# Patient Record
Sex: FEMALE | Race: OTHER | HISPANIC OR LATINO | URBAN - METROPOLITAN AREA
[De-identification: names, ages, dates, MRNs, and addresses within clinical notes are randomized per-mention and may not be internally consistent; named-entity substitution may affect disease eponyms.]

---

## 2022-08-19 ENCOUNTER — EMERGENCY (EMERGENCY)
Facility: HOSPITAL | Age: 57
LOS: 1 days | Discharge: HOME | End: 2022-08-19
Attending: EMERGENCY MEDICINE | Admitting: EMERGENCY MEDICINE
Payer: COMMERCIAL

## 2022-08-19 VITALS
SYSTOLIC BLOOD PRESSURE: 94 MMHG | HEART RATE: 78 BPM | OXYGEN SATURATION: 99 % | WEIGHT: 139.99 LBS | TEMPERATURE: 98 F | DIASTOLIC BLOOD PRESSURE: 62 MMHG | HEIGHT: 64 IN | RESPIRATION RATE: 18 BRPM

## 2022-08-19 VITALS
OXYGEN SATURATION: 99 % | TEMPERATURE: 98 F | HEART RATE: 83 BPM | SYSTOLIC BLOOD PRESSURE: 104 MMHG | RESPIRATION RATE: 16 BRPM | DIASTOLIC BLOOD PRESSURE: 67 MMHG

## 2022-08-19 LAB
ALBUMIN SERPL ELPH-MCNC: 4 G/DL — SIGNIFICANT CHANGE UP (ref 3.3–5)
ALP SERPL-CCNC: 87 U/L — SIGNIFICANT CHANGE UP (ref 40–120)
ALT FLD-CCNC: 36 U/L — SIGNIFICANT CHANGE UP (ref 10–45)
ANION GAP SERPL CALC-SCNC: 8 MMOL/L — SIGNIFICANT CHANGE UP (ref 5–17)
AST SERPL-CCNC: 33 U/L — SIGNIFICANT CHANGE UP (ref 10–40)
BASOPHILS # BLD AUTO: 0.05 K/UL — SIGNIFICANT CHANGE UP (ref 0–0.2)
BASOPHILS NFR BLD AUTO: 0.5 % — SIGNIFICANT CHANGE UP (ref 0–2)
BILIRUB SERPL-MCNC: 0.2 MG/DL — SIGNIFICANT CHANGE UP (ref 0.2–1.2)
BUN SERPL-MCNC: 19 MG/DL — SIGNIFICANT CHANGE UP (ref 7–23)
CALCIUM SERPL-MCNC: 9 MG/DL — SIGNIFICANT CHANGE UP (ref 8.4–10.5)
CHLORIDE SERPL-SCNC: 100 MMOL/L — SIGNIFICANT CHANGE UP (ref 96–108)
CO2 SERPL-SCNC: 29 MMOL/L — SIGNIFICANT CHANGE UP (ref 22–31)
CREAT SERPL-MCNC: 1.03 MG/DL — SIGNIFICANT CHANGE UP (ref 0.5–1.3)
D DIMER BLD IA.RAPID-MCNC: 222 NG/ML DDU — SIGNIFICANT CHANGE UP
EGFR: 64 ML/MIN/1.73M2 — SIGNIFICANT CHANGE UP
EOSINOPHIL # BLD AUTO: 0.08 K/UL — SIGNIFICANT CHANGE UP (ref 0–0.5)
EOSINOPHIL NFR BLD AUTO: 0.8 % — SIGNIFICANT CHANGE UP (ref 0–6)
GLUCOSE SERPL-MCNC: 119 MG/DL — HIGH (ref 70–99)
HCT VFR BLD CALC: 36.2 % — SIGNIFICANT CHANGE UP (ref 34.5–45)
HGB BLD-MCNC: 12 G/DL — SIGNIFICANT CHANGE UP (ref 11.5–15.5)
IMM GRANULOCYTES NFR BLD AUTO: 0.2 % — SIGNIFICANT CHANGE UP (ref 0–1.5)
LYMPHOCYTES # BLD AUTO: 1.96 K/UL — SIGNIFICANT CHANGE UP (ref 1–3.3)
LYMPHOCYTES # BLD AUTO: 19.7 % — SIGNIFICANT CHANGE UP (ref 13–44)
MAGNESIUM SERPL-MCNC: 2.3 MG/DL — SIGNIFICANT CHANGE UP (ref 1.6–2.6)
MCHC RBC-ENTMCNC: 29.2 PG — SIGNIFICANT CHANGE UP (ref 27–34)
MCHC RBC-ENTMCNC: 33.1 GM/DL — SIGNIFICANT CHANGE UP (ref 32–36)
MCV RBC AUTO: 88.1 FL — SIGNIFICANT CHANGE UP (ref 80–100)
MONOCYTES # BLD AUTO: 0.79 K/UL — SIGNIFICANT CHANGE UP (ref 0–0.9)
MONOCYTES NFR BLD AUTO: 7.9 % — SIGNIFICANT CHANGE UP (ref 2–14)
NEUTROPHILS # BLD AUTO: 7.05 K/UL — SIGNIFICANT CHANGE UP (ref 1.8–7.4)
NEUTROPHILS NFR BLD AUTO: 70.9 % — SIGNIFICANT CHANGE UP (ref 43–77)
NRBC # BLD: 0 /100 WBCS — SIGNIFICANT CHANGE UP (ref 0–0)
PLATELET # BLD AUTO: 322 K/UL — SIGNIFICANT CHANGE UP (ref 150–400)
POTASSIUM SERPL-MCNC: 4.2 MMOL/L — SIGNIFICANT CHANGE UP (ref 3.5–5.3)
POTASSIUM SERPL-SCNC: 4.2 MMOL/L — SIGNIFICANT CHANGE UP (ref 3.5–5.3)
PROT SERPL-MCNC: 6.8 G/DL — SIGNIFICANT CHANGE UP (ref 6–8.3)
RBC # BLD: 4.11 M/UL — SIGNIFICANT CHANGE UP (ref 3.8–5.2)
RBC # FLD: 14.5 % — SIGNIFICANT CHANGE UP (ref 10.3–14.5)
SARS-COV-2 RNA SPEC QL NAA+PROBE: NEGATIVE — SIGNIFICANT CHANGE UP
SODIUM SERPL-SCNC: 137 MMOL/L — SIGNIFICANT CHANGE UP (ref 135–145)
TROPONIN T SERPL-MCNC: 0.01 NG/ML — SIGNIFICANT CHANGE UP (ref 0–0.01)
WBC # BLD: 9.95 K/UL — SIGNIFICANT CHANGE UP (ref 3.8–10.5)
WBC # FLD AUTO: 9.95 K/UL — SIGNIFICANT CHANGE UP (ref 3.8–10.5)

## 2022-08-19 PROCEDURE — 71046 X-RAY EXAM CHEST 2 VIEWS: CPT | Mod: 26

## 2022-08-19 PROCEDURE — 71046 X-RAY EXAM CHEST 2 VIEWS: CPT

## 2022-08-19 PROCEDURE — 99285 EMERGENCY DEPT VISIT HI MDM: CPT

## 2022-08-19 PROCEDURE — 70450 CT HEAD/BRAIN W/O DYE: CPT | Mod: MA

## 2022-08-19 PROCEDURE — 70450 CT HEAD/BRAIN W/O DYE: CPT | Mod: 26,MA

## 2022-08-19 PROCEDURE — 99285 EMERGENCY DEPT VISIT HI MDM: CPT | Mod: 25

## 2022-08-19 PROCEDURE — 36415 COLL VENOUS BLD VENIPUNCTURE: CPT

## 2022-08-19 PROCEDURE — 85379 FIBRIN DEGRADATION QUANT: CPT

## 2022-08-19 PROCEDURE — 84484 ASSAY OF TROPONIN QUANT: CPT

## 2022-08-19 PROCEDURE — 80053 COMPREHEN METABOLIC PANEL: CPT

## 2022-08-19 PROCEDURE — 87635 SARS-COV-2 COVID-19 AMP PRB: CPT

## 2022-08-19 PROCEDURE — 85025 COMPLETE CBC W/AUTO DIFF WBC: CPT

## 2022-08-19 PROCEDURE — 83735 ASSAY OF MAGNESIUM: CPT

## 2022-08-19 PROCEDURE — 93010 ELECTROCARDIOGRAM REPORT: CPT

## 2022-08-19 PROCEDURE — 82962 GLUCOSE BLOOD TEST: CPT

## 2022-08-19 PROCEDURE — 93005 ELECTROCARDIOGRAM TRACING: CPT

## 2022-08-19 RX ORDER — SODIUM CHLORIDE 9 MG/ML
1000 INJECTION INTRAMUSCULAR; INTRAVENOUS; SUBCUTANEOUS ONCE
Refills: 0 | Status: COMPLETED | OUTPATIENT
Start: 2022-08-19 | End: 2022-08-19

## 2022-08-19 RX ADMIN — SODIUM CHLORIDE 1000 MILLILITER(S): 9 INJECTION INTRAMUSCULAR; INTRAVENOUS; SUBCUTANEOUS at 16:13

## 2022-08-19 NOTE — ED ADULT NURSE NOTE - OBJECTIVE STATEMENT
55 y/o female BIBEMS for evaluation of syncope episode today, Pt reports she felt dizzy, then lost consciousness, no head injury, hx of HTN.

## 2022-08-19 NOTE — ED ADULT NURSE NOTE - CHIEF COMPLAINT QUOTE
I was unable to address this during the day.  After office hours I did send a prescription for Zofran to her listed pharmacy.    She lives [place].  Do they have IV therapy at that clinic?  If so let set her up with IV therapy where she can get a L of fluid and IV Zofran as necessary.    I did try calling her.  Her voicemail was not receiving messages.   syncope

## 2022-08-19 NOTE — ED PROVIDER NOTE - NSFOLLOWUPINSTRUCTIONS_ED_ALL_ED_FT
Please follow up with your primary physician in 1-2 days for re evaluation.  Please return to ER immediately should your symptoms worsen or if you have any concern prior to this recommended follow up.    Por favor, faça o acompanhamento com seu médico primário em 1-2 carter para reavaliação. Por favor, retorne ao pronto-destiny imediatamente se seus sintomas piorarem ou se você tiver alguma preocupação antes deste acompanhamento recomendado.      Quase síncope    O QUE VOCÊ PRECISA SABER:    Quase síncope, também chamada de pré-síncope, é a sensação de que você pode desmaiar (perder a consciência), mas não desmaia. Você pode controlar alguns problemas de saúde que causam a quase síncope. Seu médico pode ajudar a criar um plano para controlar e evitar episódios de quase síncope.    INSTRUÇÕES APÓS A YAJAIRA:    Volte à emergência se:  •Você tiver lorri repentina no peito.      •Você tiver dificuldade para respirar ou falta de ar.      •Você tiver mudanças na visão, estiver suando e tiver náuseas enquanto estiver sentado ou deitado.      •Você se sentir tonto ou enrubescido e seu coração estiver irregular.      •Você perder a consciência.      •Você não puder usar o braço, a mão, o pé ou a monse, ou nolan parece fraco.      •Você tiver dificuldade para falar ou entender os outros quando eles falam.      Entre em contato com seu médico se:  •Você tiver novos sintomas ou se os sintomas piorarem.      •Seu coração bater mais rápido ou mais devagar do que o normal.      •Você tiver dúvidas ou preocupações quanto ao seu problema de saúde ou alma lidar com nolan.      Medicamentos:  •Medicamentospoderão ser necessários para ajudar seu coração a bater regularmente e com força. Seu médico também pode fazer alterações nos medicamentos que estão causando síncope.      •[place] seu medicamento conforme orientado.Entre em contato com o seu médico se achar que o medicamento não está ajudando ou se você apresentar efeitos colaterais. Informe a nolan se você for alérgico a algum medicamento. [place] moon lista de todos os medicamentos, vitaminas e ervas (fitoterápicos) que você dejah. Inclua a quantidade, quando e por que os usa. Leve a lista ou os frascos de comprimidos às consultas de acompanhamento. Leve sua lista de medicamentos consigo em kevin de emergência.      Faça o acompanhamento com seu médico conforme orientado:Você pode precisar de mais exames para ajudar a descobrir a causa de seus episódios de quase síncope. Os exames ajudarão o médico a planejar o melhor tratamento para você. Anote as dúvidas que você tiver para se lembrar de perguntar sobre elas maureen as consultas.    Controle a quase síncope:  •Sente-se ou deite-se quando necessário.Isso inclui quando você se sentir tonto, sua garganta ficar apertada e você tiver alterações na visão. Eleve as pernas acima do nível de seu coração.      •Tente respirar devagar e fundo se você começar a respirar mais rápido por ansiedade ou medo.Isso pode ajudar a diminuir a tontura e a sensação de que você pode desmaiar.      •[place] um registro de seus episódios de quase síncope.Inclua seus sintomas e sua atividade antes e depois de um episódio. O registro pode ajudar o seu médico a localizar a causa da quase síncope e ajudar você a controlar os episódios.      Evite um episódio de quase síncope:  •Mova-se lentamente e se acostume com moon posição antes de mudar para outra.Isso é muito importante ao mudar de moon posição deitada para moon sentada ou em pé. Respire fundo algumas vezes antes de se levantar quando estiver deitado. [place]-se lentamente. movimentos repentinos podem causar um episódio de desmaio. Sente-se sobre o lado da cama ou sofá por alguns minutos antes de levantar. Se você está em repouso, tente ficar na vertical por aproximadamente duas horas por daniel ou conforme orientado. Não fique com as pernas na mesma posição se você estiver de pé por um beba período de tempo. Mova suas pernas e curve os joelhos para manter o fluxo de sangue.      •Siga as recomendações do médico.O médico pode recomendar que você eden mais líquidos para evitar a desidratação. Talvez seja necessário aumentar a ingestão de sal para evitar que a pressão arterial fique muito baixa e cause síncope. Seu médico lhe dirá quanto de líquido e de sódio ingerir por daniel. Nolan(a) também dirá quanta atividade física é garza para você. Isso vai depender da causa da quase síncope.      •Observe sinais de baixo nível de glicose no sangue.Eles incluem fome, nervosismo, suor e pulsação rápida ou irregular. [place] com seu médico sobre formas de manter o nível de glicose estável.      •Verifique sua pressão arterial com frequência.Isso é importante se você dejah medicamentos para diminuir a pressão arterial. Verifique sua pressão arterial quando você estiver deitado e ao ficar de pé. Pergunte com que frequência verificar maureen o daniel. [place] um registro de sua pressão arterial. Seu médico pode usar o registro para ajudar no planejamento do tratamento.  Alma analisar moon pressão arterial           •Não faça força se estiver constipado.Você pode desmaiar kvein se esforce para evacuar. A caminhada é a melhor maneira de facilitar a evacuação. Coma alimentos ricos em fibra para facilitar a evacuação. Bons exemplos são cereais ricos em fibras, grãos, legumes e verduras e pães integrais. Suco de ameixa pode ajudar a amolecer as fezes.      •Não se exercite ao ar livre em carter quentes.A combinação de atividade física e calor pode natalie à desidratação. Isso pode causar a síncope.         © Copyright [employer] 2022           back to top                          © Copyright [employer] 2022
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
## 2022-08-19 NOTE — ED PROVIDER NOTE - PATIENT PORTAL LINK FT
You can access the FollowMyHealth Patient Portal offered by [facility] by registering at the following website: [email or web address]. By joining [employer]’s FollowMyHealth portal, you will also be able to view your health information using other applications (apps) compatible with our system.

## 2022-08-19 NOTE — ED ADULT TRIAGE NOTE - OTHER COMPLAINTS
patient BIBEMS s/p syncopal event-- patient denies head strike/blood thinner use, denies nausea/vomiting/dizziness/CP-- arrives awake, alert, and oriented-- patient refusing interpretive services-- received 500 cc NS in field PTA

## 2022-08-19 NOTE — ED PROVIDER NOTE - NS ED ROS FT
Constitutional: No fever or chills.   Eyes: No pain, blurry vision, or discharge.  ENMT: No hearing changes, pain, discharge or infections. No neck pain or stiffness.  Cardiac: No chest pain, SOB or edema. No chest pain with exertion.  Respiratory: No cough or respiratory distress. No hemoptysis. No history of asthma or RAD.  GI: No nausea, vomiting, diarrhea or abdominal pain.  : No dysuria, frequency or burning.  MS: No myalgia, muscle weakness, joint pain or back pain.  Neuro: + Near syncope.  No headache or weakness.   Skin: No skin rash.   Endocrine: No history of thyroid disease or diabetes.  Except as documented in the HPI, all other systems are negative.

## 2022-08-22 DIAGNOSIS — R55 SYNCOPE AND COLLAPSE: ICD-10-CM

## 2022-08-22 DIAGNOSIS — R42 DIZZINESS AND GIDDINESS: ICD-10-CM

## 2022-08-22 DIAGNOSIS — Z20.822 CONTACT WITH AND (SUSPECTED) EXPOSURE TO COVID-19: ICD-10-CM
